# Patient Record
(demographics unavailable — no encounter records)

---

## 2025-06-04 NOTE — HISTORY OF PRESENT ILLNESS
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  No other Breast Surgery, Breast Procedures or Nipple Discharge.   Mammo/Sono (1/28/25): FG, NSF